# Patient Record
Sex: FEMALE | Race: WHITE | NOT HISPANIC OR LATINO | Employment: UNEMPLOYED | URBAN - METROPOLITAN AREA
[De-identification: names, ages, dates, MRNs, and addresses within clinical notes are randomized per-mention and may not be internally consistent; named-entity substitution may affect disease eponyms.]

---

## 2020-06-30 PROBLEM — K64.8 INTERNAL HEMORRHOIDS: Status: ACTIVE | Noted: 2020-06-30

## 2020-06-30 PROBLEM — K29.70 GASTRITIS: Status: ACTIVE | Noted: 2020-06-30

## 2020-07-13 ENCOUNTER — NURSE TRIAGE (OUTPATIENT)
Dept: ADMINISTRATIVE | Facility: CLINIC | Age: 55
End: 2020-07-13

## 2020-07-14 NOTE — TELEPHONE ENCOUNTER
RN attempted to contact pt through the Post Procedural Symptom Tracker for Day 13.  Unable to reach pt.  No additional calls or action needed at this time per protocol.      Reason for Disposition   No answer.  First attempt to contact caller.  Follow-up call scheduled within 15 minutes.    Additional Information   Negative: Caller is angry or rude (e.g., hangs up, verbally abusive, yelling)   Negative: Caller hangs up   Negative: Caller has already spoken with the PCP and has no further questions.   Negative: Caller has already spoken with another triager and has no further questions.   Negative: Caller has already spoken with another triager or PCP AND has further questions AND triager able to answer questions.    Protocols used: NO CONTACT OR DUPLICATE CONTACT CALL-A-AH

## 2020-07-27 PROBLEM — K58.1 IRRITABLE BOWEL SYNDROME WITH CONSTIPATION: Status: ACTIVE | Noted: 2020-07-27

## 2020-07-27 PROBLEM — K21.9 GASTROESOPHAGEAL REFLUX DISEASE WITHOUT ESOPHAGITIS: Status: ACTIVE | Noted: 2020-07-27

## 2020-07-27 PROBLEM — E66.9 OBESITY (BMI 30.0-34.9): Status: ACTIVE | Noted: 2020-07-27

## 2020-07-27 PROBLEM — G47.33 OSA (OBSTRUCTIVE SLEEP APNEA): Status: ACTIVE | Noted: 2020-07-27

## 2021-06-09 PROBLEM — G47.33 OSA (OBSTRUCTIVE SLEEP APNEA): Status: RESOLVED | Noted: 2020-07-27 | Resolved: 2021-06-09

## 2021-06-10 PROBLEM — K21.9 SLEEP RELATED GASTROESOPHAGEAL REFLUX DISEASE: Status: ACTIVE | Noted: 2021-06-10

## 2021-06-10 PROBLEM — R29.818 SUSPECTED SLEEP APNEA: Status: ACTIVE | Noted: 2021-06-10

## 2021-06-10 PROBLEM — G47.01 INSOMNIA DUE TO MEDICAL CONDITION: Status: ACTIVE | Noted: 2021-06-10

## 2021-06-10 PROBLEM — R51.9 CHRONIC NONINTRACTABLE HEADACHE: Status: ACTIVE | Noted: 2021-06-10

## 2021-06-10 PROBLEM — G89.29 CHRONIC NONINTRACTABLE HEADACHE: Status: ACTIVE | Noted: 2021-06-10

## 2022-04-28 PROBLEM — M51.36 DEGENERATIVE DISC DISEASE, LUMBAR: Status: ACTIVE | Noted: 2022-04-28

## 2022-04-28 PROBLEM — M25.562 LEFT KNEE PAIN: Status: ACTIVE | Noted: 2022-04-28

## 2022-08-18 PROBLEM — M67.952 TENDINOPATHY OF LEFT GLUTEAL REGION: Status: ACTIVE | Noted: 2022-08-18

## 2022-08-18 PROBLEM — M54.16 LUMBAR RADICULOPATHY: Status: ACTIVE | Noted: 2022-08-18

## 2022-10-05 PROBLEM — G43.719 INTRACTABLE CHRONIC MIGRAINE WITHOUT AURA AND WITHOUT STATUS MIGRAINOSUS: Status: ACTIVE | Noted: 2022-10-05

## 2023-08-23 PROBLEM — K30 NONULCER DYSPEPSIA: Status: ACTIVE | Noted: 2023-08-23
